# Patient Record
Sex: FEMALE | Race: WHITE | Employment: OTHER | URBAN - NONMETROPOLITAN AREA
[De-identification: names, ages, dates, MRNs, and addresses within clinical notes are randomized per-mention and may not be internally consistent; named-entity substitution may affect disease eponyms.]

---

## 2022-02-22 ENCOUNTER — HOSPITAL ENCOUNTER (OUTPATIENT)
Dept: INFUSION THERAPY | Age: 82
Discharge: HOME | End: 2022-02-22
Payer: MEDICARE

## 2022-02-22 ENCOUNTER — OFFICE VISIT (OUTPATIENT)
Dept: ONCOLOGY | Age: 82
End: 2022-02-22
Payer: MEDICARE

## 2022-02-22 VITALS
SYSTOLIC BLOOD PRESSURE: 131 MMHG | HEART RATE: 59 BPM | OXYGEN SATURATION: 95 % | HEIGHT: 67 IN | TEMPERATURE: 98.3 F | RESPIRATION RATE: 18 BRPM | WEIGHT: 187.6 LBS | DIASTOLIC BLOOD PRESSURE: 75 MMHG | BODY MASS INDEX: 29.44 KG/M2

## 2022-02-22 VITALS
WEIGHT: 187.6 LBS | RESPIRATION RATE: 18 BRPM | SYSTOLIC BLOOD PRESSURE: 131 MMHG | DIASTOLIC BLOOD PRESSURE: 75 MMHG | HEIGHT: 67 IN | BODY MASS INDEX: 29.44 KG/M2 | HEART RATE: 59 BPM | TEMPERATURE: 98.3 F

## 2022-02-22 DIAGNOSIS — D47.2 MGUS (MONOCLONAL GAMMOPATHY OF UNKNOWN SIGNIFICANCE): ICD-10-CM

## 2022-02-22 DIAGNOSIS — R79.9 ABNORMAL SERUM TOTAL PROTEIN LEVEL: Primary | ICD-10-CM

## 2022-02-22 LAB
ABSOLUTE IMMATURE GRANULOCYTE: 0 THOU/MM3 (ref 0–0.07)
ALBUMIN SERPL-MCNC: 4.1 G/DL (ref 3.5–5.1)
ALP BLD-CCNC: 72 U/L (ref 38–126)
ALT SERPL-CCNC: 27 U/L (ref 11–66)
AST SERPL-CCNC: 26 U/L (ref 5–40)
BASINOPHIL, AUTOMATED: 0 % (ref 0–3)
BASOPHILS ABSOLUTE: 0 THOU/MM3 (ref 0–0.1)
BILIRUB SERPL-MCNC: 0.2 MG/DL (ref 0.3–1.2)
BILIRUBIN DIRECT: < 0.2 MG/DL (ref 0–0.3)
BUN, WHOLE BLOOD: 14 MG/DL (ref 8–26)
CALCIUM SERPL-MCNC: 9.8 MG/DL (ref 8.5–10.5)
CHLORIDE, WHOLE BLOOD: 108 MEQ/L (ref 98–109)
CREATININE, WHOLE BLOOD: 1 MG/DL (ref 0.5–1.2)
EOSINOPHILS ABSOLUTE: 0.1 THOU/MM3 (ref 0–0.4)
EOSINOPHILS RELATIVE PERCENT: 3 % (ref 0–4)
GFR, ESTIMATED ,CON: 56 ML/MIN/1.73M2
GLUCOSE, WHOLE BLOOD: 98 MG/DL (ref 70–108)
HCT VFR BLD CALC: 45.5 % (ref 37–47)
HEMOGLOBIN: 14.5 GM/DL (ref 12–16)
IMMATURE GRANULOCYTES: 0 %
IONIZED CALCIUM, WHOLE BLOOD: 1.24 MMOL/L (ref 1.12–1.32)
LYMPHOCYTES # BLD: 37 % (ref 15–47)
LYMPHOCYTES ABSOLUTE: 1.7 THOU/MM3 (ref 1–4.8)
MAGNESIUM: 2.1 MG/DL (ref 1.6–2.4)
MCH RBC QN AUTO: 30.1 PG (ref 26–33)
MCHC RBC AUTO-ENTMCNC: 31.9 GM/DL (ref 32.2–35.5)
MCV RBC AUTO: 94 FL (ref 81–99)
MONOCYTES ABSOLUTE: 0.5 THOU/MM3 (ref 0.4–1.3)
MONOCYTES: 10 % (ref 0–12)
PDW BLD-RTO: 13.1 % (ref 11.5–14.5)
PLATELET # BLD: 215 THOU/MM3 (ref 130–400)
PMV BLD AUTO: 9.4 FL (ref 9.4–12.4)
POTASSIUM, WHOLE BLOOD: 4.2 MEQ/L (ref 3.5–4.9)
RBC # BLD: 4.82 MILL/MM3 (ref 4.2–5.4)
SEG NEUTROPHILS: 50 % (ref 43–75)
SEGMENTED NEUTROPHILS ABSOLUTE COUNT: 2.3 THOU/MM3 (ref 1.8–7.7)
SODIUM, WHOLE BLOOD: 145 MEQ/L (ref 138–146)
TOTAL CO2, WHOLE BLOOD: 30 MEQ/L (ref 23–33)
TOTAL PROTEIN: 7.5 G/DL (ref 6.1–8)
WBC # BLD: 4.5 THOU/MM3 (ref 4.8–10.8)

## 2022-02-22 PROCEDURE — 83615 LACTATE (LD) (LDH) ENZYME: CPT

## 2022-02-22 PROCEDURE — 85025 COMPLETE CBC W/AUTO DIFF WBC: CPT

## 2022-02-22 PROCEDURE — 83735 ASSAY OF MAGNESIUM: CPT

## 2022-02-22 PROCEDURE — 84155 ASSAY OF PROTEIN SERUM: CPT

## 2022-02-22 PROCEDURE — 84165 PROTEIN E-PHORESIS SERUM: CPT

## 2022-02-22 PROCEDURE — 83883 ASSAY NEPHELOMETRY NOT SPEC: CPT

## 2022-02-22 PROCEDURE — 80047 BASIC METABLC PNL IONIZED CA: CPT

## 2022-02-22 PROCEDURE — 99211 OFF/OP EST MAY X REQ PHY/QHP: CPT

## 2022-02-22 PROCEDURE — 82784 ASSAY IGA/IGD/IGG/IGM EACH: CPT

## 2022-02-22 PROCEDURE — 80076 HEPATIC FUNCTION PANEL: CPT

## 2022-02-22 PROCEDURE — 82310 ASSAY OF CALCIUM: CPT

## 2022-02-22 PROCEDURE — 86334 IMMUNOFIX E-PHORESIS SERUM: CPT

## 2022-02-22 PROCEDURE — 99203 OFFICE O/P NEW LOW 30 MIN: CPT | Performed by: INTERNAL MEDICINE

## 2022-02-22 RX ORDER — PRAVASTATIN SODIUM 40 MG
40 TABLET ORAL DAILY
COMMUNITY

## 2022-02-22 RX ORDER — ASPIRIN 81 MG/1
81 TABLET, CHEWABLE ORAL DAILY
COMMUNITY

## 2022-02-22 RX ORDER — LEVOTHYROXINE SODIUM 112 UG/1
112 TABLET ORAL DAILY
COMMUNITY

## 2022-02-22 RX ORDER — OMEPRAZOLE 20 MG/1
20 CAPSULE, DELAYED RELEASE ORAL DAILY
COMMUNITY

## 2022-02-22 RX ORDER — LOSARTAN POTASSIUM 100 MG/1
100 TABLET ORAL DAILY
COMMUNITY

## 2022-02-23 LAB — LD: 160 U/L (ref 100–190)

## 2022-02-25 LAB — KAPPA/LAMBDA FREE LIGHT CHAINS: NORMAL

## 2022-02-26 LAB — IMMUNOFIXATION WITH QUANT: NORMAL

## 2022-03-12 NOTE — PROGRESS NOTES
[facility]  [facility] OF [facility]  [facility] [address] [facility]  [address] [address]  Dept: [phone]  Dept Fax: [phone]: [phone]     Encounter Date: 02/22/2022    Referring Physician:  Cy Torres,*     Primary Provider: Elena Mayorga MD     Subjective:      Chief Complaint:  Alejandro Bruno is a 79 y.o. with elevated protein. HPI:  This is the first visit to the [facility] for this patient who was referred by Cy Torres,* for evaluation of evaded protein. The patient is an elderly  female who has been in fairly good health the majority of her life. She does have a history of arthritis and thyroid disease. On laboratory studies the patient was recently found to have an elevated kappa serum free light chain. Her free serumvkappa light chain was noted to be 36.2 mg/L. The patient's free serum lambda light chain was normal.  Therefore her kappa to lambda ratio was slightly elevated. Serum protein electrophoresis was unremarkable except for slightly low albumin. The patient has no symptoms to be suggestive of relationship to the elevated serum free kappa light chain. Her only complaint today on her review of systems is vision changes. She has not had fever, cough, shortness of breath or other signs of infection. The patient's bowel and bladder habits have been normal.  She has not seen blood in her stool or urine. The patient remains active with an ECOG performance status of level 0. Past Medical History  She  has a past medical history of Arthritis and Thyroid disease. Surgical History  She  has a past surgical history that includes Hysterectomy, total abdominal; Appendectomy; and Cholecystectomy.     Home Medications  She has a current medication list which includes the following prescription(s): omeprazole, losartan, levothyroxine, multiple vitamins-minerals, calcium citrate-vitamin d, pravastatin, amlodipine-atorvastatin, aspirin, and glucos-chondroit-hyaluron-msm. Allergies  Allergies   Allergen Reactions    Codeine Other (See Comments)     CHEST PAIN    Vicodin Hp [Hydrocodone-Acetaminophen]      HALLUCINATIONS     Social History  She  reports that she has never smoked. She has never used smokeless tobacco. She reports previous alcohol use. She reports that she does not use drugs. Family History  Her family history includes Heart Attack in her brother; Kidney Disease in her sister; Other in her sister. Review of Systems  Constitutional: Negative. HENT: Negative. Eyes: Vision changes. Respiratory: Negative. Cardiovascular: Negative. Gastrointestinal: Negative. Genitourinary: Negative. Musculoskeletal: Negative. Skin: Negative. Neurological: Negative. Hematological: Negative. Psychiatric/Behavioral: Negative. Objective:   Physical Exam  Vitals:    02/22/22 1454   BP: 131/75   Pulse: 59   Resp: 18   Temp: 98.3 °F (36.8 °C)   SpO2: 95%   Vitals reviewed and are stable. Constitutional: Elderly. No acute distress. HENT: Normocephalic and atraumatic. Eyes: Pupils appear equal. No scleral icterus. Pulmonary: Effort normal. No respiratory distress. Musculoskeletal: Gait is abnormal. Muscle strength and tone grossly decreased. Neurological: Alert and oriented to person, place, and time. Judgment and thought content normal.    Psychiatric: Mood and affect appropriate for the clinical situation. .     Assessment:   1. Elevated free serum kappa light chain. Plan:   I had an consultation with this patient today. The majority of this time was spent in direct face to face discussion regarding her elevated free serum kappa light chain. I did explain to the patient the elevation of the free serum kappa light chain would be considered mild in nature.   The potential causes and potential implications of this finding on her laboratory studies was reviewed. Multiple questions were answered during the course of the consultation. By the end of the consultation all the patient's questions have been answered. The following will be completed for further evaluation of her elevated free serum kappa light chain:   CBC with Auto Differential    POC PANEL BMP W/IOCA    Hepatic Function Panel    Immunofixation w/ Quant    Kappa/Lambda Quantitative Free Light Chains, Serum    Calcium    Magnesium    Lactate Dehydrogenase   Otherwise, I recommended a continued pattern or surveillance of her elevated free serum kappa light chain    Barbi Bishop M.D. Medical Director: [facility]  [facility] [place] [address] [facility], [address] [place] [address], [address] [place] [address], [address] [place] [address], [address] [place] [address] of the [facility] at [facility]      **This report has been created using voice recognition software. It may contain minor errors which are inherent in voice recognition technology. **

## 2022-03-29 ENCOUNTER — TELEPHONE (OUTPATIENT)
Dept: ONCOLOGY | Age: 82
End: 2022-03-29

## 2022-03-29 NOTE — TELEPHONE ENCOUNTER
Kappa light chain remains slightly elevated but stable. Recommend to continue to pattern surveillance. Check kappa light chain level 2 times per year.